# Patient Record
Sex: FEMALE | Race: WHITE
[De-identification: names, ages, dates, MRNs, and addresses within clinical notes are randomized per-mention and may not be internally consistent; named-entity substitution may affect disease eponyms.]

---

## 2019-06-18 PROBLEM — Z00.00 ENCOUNTER FOR PREVENTIVE HEALTH EXAMINATION: Status: ACTIVE | Noted: 2019-06-18

## 2019-08-05 ENCOUNTER — APPOINTMENT (OUTPATIENT)
Dept: OTOLARYNGOLOGY | Facility: CLINIC | Age: 69
End: 2019-08-05
Payer: MEDICARE

## 2019-08-05 VITALS
SYSTOLIC BLOOD PRESSURE: 122 MMHG | WEIGHT: 127 LBS | TEMPERATURE: 98.4 F | RESPIRATION RATE: 15 BRPM | HEIGHT: 61 IN | OXYGEN SATURATION: 99 % | HEART RATE: 79 BPM | BODY MASS INDEX: 23.98 KG/M2 | DIASTOLIC BLOOD PRESSURE: 71 MMHG

## 2019-08-05 DIAGNOSIS — R26.89 OTHER ABNORMALITIES OF GAIT AND MOBILITY: ICD-10-CM

## 2019-08-05 DIAGNOSIS — Z87.39 PERSONAL HISTORY OF OTHER DISEASES OF THE MUSCULOSKELETAL SYSTEM AND CONNECTIVE TISSUE: ICD-10-CM

## 2019-08-05 DIAGNOSIS — Z86.79 PERSONAL HISTORY OF OTHER DISEASES OF THE CIRCULATORY SYSTEM: ICD-10-CM

## 2019-08-05 DIAGNOSIS — H54.7 UNSPECIFIED VISUAL LOSS: ICD-10-CM

## 2019-08-05 DIAGNOSIS — R42 DIZZINESS AND GIDDINESS: ICD-10-CM

## 2019-08-05 DIAGNOSIS — G43.909 MIGRAINE, UNSPECIFIED, NOT INTRACTABLE, W/OUT STATUS MIGRAINOSUS: ICD-10-CM

## 2019-08-05 DIAGNOSIS — Z78.9 OTHER SPECIFIED HEALTH STATUS: ICD-10-CM

## 2019-08-05 PROCEDURE — 99204 OFFICE O/P NEW MOD 45 MIN: CPT

## 2019-08-05 RX ORDER — SUMATRIPTAN SUCCINATE 100 MG/1
TABLET, FILM COATED ORAL
Refills: 0 | Status: ACTIVE | COMMUNITY

## 2019-08-07 PROBLEM — R42 DIZZINESS: Status: ACTIVE | Noted: 2019-08-05

## 2019-08-07 PROBLEM — R26.89 LOSS OF BALANCE: Status: ACTIVE | Noted: 2019-08-05

## 2019-08-07 NOTE — DATA REVIEWED
[de-identified] :  b symm downsloping snh; vng nonspecific bu has positive d/h [de-identified] : 2 sets of extensive records reviewed [de-identified] : mri thornwaldt cyst, wm dz they are concerned for demyelination  but neurologist supposedly  not, loss of signal in l psc - no tmors

## 2019-08-07 NOTE — PHYSICAL EXAM
[FreeTextEntry1] : vision impaired [Midline] : trachea located in midline position [] : [place] test is negative [Normal] : no rashes [de-identified] : gait steady

## 2019-08-07 NOTE — HISTORY OF PRESENT ILLNESS
[de-identified] : 67 yo woman with 2 yr h/o dizziness/loss of balance. she is legally blind due to macular degeneration. She denies true spinning and sx last secs - min at time. they can be severe. She has seen numerous physicians, incl Dr Fields who treats her for migraines, Dr Meek, Dr Osterweil, Dr Hernández and v rehab for this but remains symptomatic. She also has b hl symmetric slowly progressive for years. Here to see if a dx and treatment can be recommended.,

## 2019-08-30 ENCOUNTER — APPOINTMENT (OUTPATIENT)
Dept: OTOLARYNGOLOGY | Facility: CLINIC | Age: 69
End: 2019-08-30

## 2022-11-04 ENCOUNTER — APPOINTMENT (OUTPATIENT)
Dept: OTOLARYNGOLOGY | Facility: CLINIC | Age: 72
End: 2022-11-04

## 2022-11-04 VITALS
OXYGEN SATURATION: 98 % | RESPIRATION RATE: 16 BRPM | SYSTOLIC BLOOD PRESSURE: 114 MMHG | DIASTOLIC BLOOD PRESSURE: 72 MMHG | HEART RATE: 86 BPM | WEIGHT: 122 LBS | TEMPERATURE: 97.2 F | BODY MASS INDEX: 23.03 KG/M2 | HEIGHT: 61 IN

## 2022-11-04 DIAGNOSIS — H61.23 IMPACTED CERUMEN, BILATERAL: ICD-10-CM

## 2022-11-04 PROCEDURE — 99202 OFFICE O/P NEW SF 15 MIN: CPT

## 2022-11-05 NOTE — HISTORY OF PRESENT ILLNESS
[de-identified] : 72 yo fo with h/o b hl; wears ha - and feels she has cerumen impaction - here for removal- she says she had problems with 2 prior physicians when they attempted cerumen removal - then another doctor - had vestibular testing and told dizziness was from left. She said she is no longer experiencing dizziness and only wants to address cerumen at this visit. No FH or SH relevant to cc. She is a ha user and feels the ha may impact cerumen further.

## 2022-11-05 NOTE — PROCEDURE
[Cerumen Impaction] : Cerumen Impaction [Same] : same as the Pre Op Dx. [] : Removal of Cerumen [FreeTextEntry5] : b copious cerumen removed atraumatically with suction under microscope, b tms nl

## 2022-11-05 NOTE — PHYSICAL EXAM
[de-identified] : b copious cerumen removed atraumatically with suction under microscope, r with aligator forceps also, b eacs tms nl  [Normal] : no nystagmus [de-identified] : gait steady

## 2023-07-12 ENCOUNTER — APPOINTMENT (OUTPATIENT)
Dept: OTOLARYNGOLOGY | Facility: CLINIC | Age: 73
End: 2023-07-12
Payer: MEDICARE

## 2023-07-12 VITALS
HEIGHT: 61 IN | DIASTOLIC BLOOD PRESSURE: 78 MMHG | HEART RATE: 91 BPM | WEIGHT: 122 LBS | SYSTOLIC BLOOD PRESSURE: 146 MMHG | OXYGEN SATURATION: 98 % | BODY MASS INDEX: 23.03 KG/M2 | TEMPERATURE: 98 F

## 2023-07-12 DIAGNOSIS — H61.22 IMPACTED CERUMEN, LEFT EAR: ICD-10-CM

## 2023-07-12 DIAGNOSIS — R42 DIZZINESS AND GIDDINESS: ICD-10-CM

## 2023-07-12 PROCEDURE — 99212 OFFICE O/P EST SF 10 MIN: CPT

## 2023-07-12 NOTE — PHYSICAL EXAM
[de-identified] : r nl, l copious cerumen removed atraumatically with suction under microscope, b TMs nl  [Normal] : no nystagmus [de-identified] : gait steady

## 2023-07-12 NOTE — ASSESSMENT
[FreeTextEntry1] : 1. l cerumen impaction \par -cerumen removed\par -ears felt better\par 2. vertigo\par -explained we would need to order a new , VNG, and MRI to offer an opinion on her dizziness- she prefers to give it more thought and said she would let us know in the future if she is interested in getting these tests\par RTC in 6 months; call sooner for any issues

## 2023-07-12 NOTE — PROCEDURE
[Cerumen Impaction] : Cerumen Impaction [Same] : same as the Pre Op Dx. [] : Removal of Cerumen [FreeTextEntry5] : r nl, l copious cerumen removed atraumatically with suction under microscope, b TMs nl

## 2023-07-12 NOTE — HISTORY OF PRESENT ILLNESS
[de-identified] : 8 month followup visit for this 71 y/o F with h/o b snhl. She wears hearing aids and is here to have ears checked for cerumen impaction. She has been dizzy in the past and had an inner ear workup at an outside facility which revealed nonspecific findings. She is c/o "low level" dizziness. She sees a neurologist for headaches. Her daughter has h/o vestibular migraines.